# Patient Record
Sex: FEMALE | NOT HISPANIC OR LATINO | Employment: OTHER | URBAN - METROPOLITAN AREA
[De-identification: names, ages, dates, MRNs, and addresses within clinical notes are randomized per-mention and may not be internally consistent; named-entity substitution may affect disease eponyms.]

---

## 2019-01-10 ENCOUNTER — TELEPHONE (OUTPATIENT)
Dept: SLEEP CENTER | Facility: CLINIC | Age: 79
End: 2019-01-10

## 2019-01-16 ENCOUNTER — OFFICE VISIT (OUTPATIENT)
Dept: SLEEP CENTER | Facility: CLINIC | Age: 79
End: 2019-01-16
Payer: MEDICARE

## 2019-01-16 VITALS
HEART RATE: 91 BPM | WEIGHT: 293 LBS | DIASTOLIC BLOOD PRESSURE: 78 MMHG | SYSTOLIC BLOOD PRESSURE: 140 MMHG | HEIGHT: 69 IN | BODY MASS INDEX: 43.4 KG/M2

## 2019-01-16 DIAGNOSIS — G47.33 OSA (OBSTRUCTIVE SLEEP APNEA): Primary | ICD-10-CM

## 2019-01-16 PROCEDURE — 99213 OFFICE O/P EST LOW 20 MIN: CPT | Performed by: INTERNAL MEDICINE

## 2019-01-16 RX ORDER — LIDOCAINE 50 MG/G
1 PATCH TOPICAL EVERY 12 HOURS
COMMUNITY

## 2019-01-16 RX ORDER — ALLOPURINOL 100 MG/1
100 TABLET ORAL
COMMUNITY

## 2019-01-16 RX ORDER — TRAMADOL HYDROCHLORIDE 50 MG/1
TABLET ORAL
Refills: 0 | COMMUNITY
Start: 2018-12-20

## 2019-01-16 RX ORDER — ALLOPURINOL 100 MG/1
TABLET ORAL
COMMUNITY
Start: 2018-12-04

## 2019-01-16 RX ORDER — UBIDECARENONE 200 MG
200 CAPSULE ORAL
COMMUNITY

## 2019-01-16 RX ORDER — LEVOTHYROXINE AND LIOTHYRONINE 19; 4.5 UG/1; UG/1
TABLET ORAL DAILY
COMMUNITY

## 2019-01-16 RX ORDER — ASPIRIN 81 MG/1
TABLET, CHEWABLE ORAL DAILY
COMMUNITY

## 2019-01-16 RX ORDER — OMEPRAZOLE 40 MG/1
CAPSULE, DELAYED RELEASE ORAL
COMMUNITY
Start: 2018-11-08

## 2019-01-16 RX ORDER — LISINOPRIL AND HYDROCHLOROTHIAZIDE 25; 20 MG/1; MG/1
TABLET ORAL DAILY
COMMUNITY

## 2019-01-16 NOTE — PROGRESS NOTES
Progress Note - Sleep Center   Kip Katz NX MRN: [id number]      Reason for Visit:  70 y  o female here for annual follow-up    Assessment:  Doing well on current therapy of APAP 12 to 16 cm for obstructive sleep apnea (12 9 from )  Plan:  Continue same    Follow up: One year    History of Present Illness:  History of FAYE on PAP therapy  Fully compliant and deriving benefit  Review of Systems      Genitourinary hot flashes at night   Cardiology ankle/leg swelling   Gastrointestinal none   Neurology need to move extremities, muscle weakness, numbness/tingling of an extremity, poor concentration or confusion,  and balance problems   Constitutional fatigue and excessive sweating at night   Integumentary none   Psychiatry anxiety and depression   Musculoskeletal joint pain, muscle aches, back pain and leg cramps   Pulmonary shortness of breath with activity   ENT none   Endocrine frequent urination   Hematological none         Historical Information    Past Medical History: History reviewed  No pertinent past medical history  Reflex sympathetic dystrophy, fibromyalgia, mercury poisoning      Past Surgical History: History reviewed  No pertinent surgical history  Social History:   Social History     Social History    Marital status:      Spouse name: N/A    Number of children: N/A    Years of education: N/A     Social History Main Topics    Smoking status: Former Smoker     Quit date: 1969    Smokeless tobacco: Never Used    Alcohol use No    Drug use: No    Sexual activity: Not Asked     Other Topics Concern    None     Social History Narrative    None       Family History: History reviewed  No pertinent family history      Medications/Allergies:      Current Outpatient Prescriptions:     allopurinol (ZYLOPRIM) 100 mg tablet, , Disp: , Rfl:     allopurinol (ZYLOPRIM) 100 mg tablet, Take 100 mg by mouth, Disp: , Rfl:     aspirin 81 mg chewable tablet, Daily, Disp: , Rfl:     B Complex Vitamins (VITAMIN B COMPLEX PO), take one tablet once daily, Disp: , Rfl:     Cholecalciferol 62046 units TABS, Take 50,000 Units by mouth, Disp: , Rfl:     Coenzyme Q10 200 MG capsule, Take 200 mg by mouth, Disp: , Rfl:     DOCOSAHEXAENOIC ACID PO, 6 caps daily, Disp: , Rfl:     lidocaine (LIDODERM) 5 %, Place 1 patch on the skin every 12 (twelve) hours, Disp: , Rfl:     lisinopril-hydrochlorothiazide (PRINZIDE,ZESTORETIC) 20-25 MG per tablet, Daily, Disp: , Rfl:     metFORMIN (GLUCOPHAGE) 500 mg tablet, , Disp: , Rfl:     omeprazole (PriLOSEC) 40 MG capsule, , Disp: , Rfl:     thyroid desiccated (ARMOUR THYROID) 30 mg tablet, Daily, Disp: , Rfl:     traMADol (ULTRAM) 50 mg tablet, TAKE 1 TO 2 TABLETS EVERY 6 HOURS AS NEEDED FOR PAIN, Disp: , Rfl: 0          Objective      Vital Signs:   Vitals:    01/16/19 1100   BP: 140/78   Pulse: 91     [place] Sleepiness Scale: Total score: 3        Physical Exam:    General: Alert, appropriate, cooperative, overweight    Head: NC/AT    Skin: Warm, dry    Neuro: No motor abnormalities, cranial nerves appear intact    Extremity: No clubbing, cyanosis      DME Provider: Kody Weeks    I have reviewed and updated the review of systems as necessary    Counseling / Coordination of Care   I have spent 20 minutes with Patient  today in which greater than 50% of this time was spent in counseling/coordination of care regarding Intructions for management                Board Certified Sleep Specialist

## 2020-03-12 ENCOUNTER — OFFICE VISIT (OUTPATIENT)
Dept: SLEEP CENTER | Facility: CLINIC | Age: 80
End: 2020-03-12
Payer: MEDICARE

## 2020-03-12 VITALS
BODY MASS INDEX: 44.41 KG/M2 | HEART RATE: 48 BPM | WEIGHT: 293 LBS | SYSTOLIC BLOOD PRESSURE: 160 MMHG | DIASTOLIC BLOOD PRESSURE: 60 MMHG | HEIGHT: 68 IN

## 2020-03-12 DIAGNOSIS — G47.33 OSA (OBSTRUCTIVE SLEEP APNEA): Primary | ICD-10-CM

## 2020-03-12 PROCEDURE — 99213 OFFICE O/P EST LOW 20 MIN: CPT | Performed by: INTERNAL MEDICINE

## 2020-03-12 RX ORDER — METHYLPHENIDATE HYDROCHLORIDE 5 MG/1
5 TABLET ORAL DAILY
COMMUNITY
Start: 2020-02-26

## 2020-03-12 RX ORDER — FLUTICASONE PROPIONATE 50 MCG
SPRAY, SUSPENSION (ML) NASAL
COMMUNITY

## 2020-03-12 RX ORDER — AMPICILLIN TRIHYDRATE 250 MG
500 CAPSULE ORAL DAILY
COMMUNITY

## 2020-03-12 RX ORDER — NYSTATIN 100000 [USP'U]/G
POWDER TOPICAL
COMMUNITY

## 2020-03-12 RX ORDER — HYDROCHLOROTHIAZIDE 12.5 MG/1
TABLET ORAL
COMMUNITY

## 2020-03-12 RX ORDER — MAGNESIUM OXIDE 400 MG/1
400 TABLET ORAL 2 TIMES DAILY
COMMUNITY

## 2021-03-12 ENCOUNTER — OFFICE VISIT (OUTPATIENT)
Dept: SLEEP CENTER | Facility: CLINIC | Age: 81
End: 2021-03-12
Payer: MEDICARE

## 2021-03-12 VITALS — HEIGHT: 70 IN | BODY MASS INDEX: 41.37 KG/M2 | WEIGHT: 289 LBS

## 2021-03-12 DIAGNOSIS — G47.33 OSA (OBSTRUCTIVE SLEEP APNEA): Primary | ICD-10-CM

## 2021-03-12 PROCEDURE — 99213 OFFICE O/P EST LOW 20 MIN: CPT | Performed by: INTERNAL MEDICINE

## 2021-03-12 NOTE — PROGRESS NOTES
Progress Note - Sleep Center   Dominique Valladares UYP: MRN: [id number]      Reason for Visit:  68 y  o female here for annual follow-up    Assessment:  Doing well on current therapy of APAP 12 to 16 cm for  Mild FAYE ( AHI = 12 9)  Plan:  Continue same    Follow up: One year    History of Present Illness:  History of FAYE on PAP therapy  Fully compliant and deriving benefit  She is fatigued and in pain status post permanent pacemaker placement  She had a near cardiac arrest     Review of Systems      Genitourinary need to urinate more than twice a night and post menopausal (no peroids)   Cardiology Frequent chest pain or angina,  and ankle/leg swelling   Gastrointestinal none   Neurology muscle weakness, forgetfulness, poor concentration or confusion, , difficulty with memory and balance problems   Constitutional fatigue   Integumentary none   Psychiatry none   Musculoskeletal joint pain, muscle aches, back pain and leg cramps   Pulmonary shortness of breath with activity and frequent cough   ENT throat clearing   Endocrine frequent urination   Hematological none           I have reviewed and updated the review of systems as necessary      Historical Information    Past Medical History: No past medical history on file  Past Surgical History: No past surgical history on file  Social History:   Social History     Socioeconomic History    Marital status:       Spouse name: None    Number of children: None    Years of education: None    Highest education level: None   Occupational History    None   Social Needs    Financial resource strain: None    Food insecurity     Worry: None     Inability: None    Transportation needs     Medical: None     Non-medical: None   Tobacco Use    Smoking status: Former Smoker     Quit date: 1969     Years since quittin 1    Smokeless tobacco: Never Used   Substance and Sexual Activity    Alcohol use: No    Drug use: No    Sexual activity: None Lifestyle    Physical activity     Days per week: None     Minutes per session: None    Stress: None   Relationships    Social connections     Talks on phone: None     Gets together: None     Attends Sabianism service: None     Active member of club or organization: None     Attends meetings of clubs or organizations: None     Relationship status: None    Intimate partner violence     Fear of current or ex partner: None     Emotionally abused: None     Physically abused: None     Forced sexual activity: None   Other Topics Concern    None   Social History Narrative    None       Family History: No family history on file      Medications/Allergies:      Current Outpatient Medications:     allopurinol (ZYLOPRIM) 100 mg tablet, , Disp: , Rfl:     allopurinol (ZYLOPRIM) 100 mg tablet, Take 100 mg by mouth, Disp: , Rfl:     Ascorbic Acid, Vitamin C, (VITAMIN C) 100 MG tablet, Take 1,500 mg by mouth, Disp: , Rfl:     aspirin 81 mg chewable tablet, Daily, Disp: , Rfl:     B Complex Vitamins (VITAMIN B COMPLEX PO), take one tablet once daily, Disp: , Rfl:     Cholecalciferol 96154 units TABS, Take 50,000 Units by mouth, Disp: , Rfl:     Cinnamon 500 MG capsule, Take 500 mg by mouth daily, Disp: , Rfl:     Coenzyme Q10 200 MG capsule, Take 200 mg by mouth, Disp: , Rfl:     DOCOSAHEXAENOIC ACID PO, 6 caps daily, Disp: , Rfl:     fluticasone (FLONASE) 50 mcg/act nasal spray, fluticasone propionate 50 mcg/actuation nasal spray,suspension, Disp: , Rfl:     hydrochlorothiazide (HYDRODIURIL) 12 5 mg tablet, hydrochlorothiazide 12 5 mg tablet, Disp: , Rfl:     lidocaine (LIDODERM) 5 %, Place 1 patch on the skin every 12 (twelve) hours, Disp: , Rfl:     lisinopril-hydrochlorothiazide (PRINZIDE,ZESTORETIC) 20-25 MG per tablet, Daily, Disp: , Rfl:     magnesium oxide (MAG-OX) 400 mg tablet, Take 400 mg by mouth 2 (two) times a day, Disp: , Rfl:     metFORMIN (GLUCOPHAGE) 500 mg tablet, , Disp: , Rfl:    methylphenidate (RITALIN) 5 mg tablet, Take 5 mg by mouth daily, Disp: , Rfl:     Multiple Vitamins-Minerals (MULTIVITAMIN ADULT PO), Daily, Disp: , Rfl:     mupirocin (BACTROBAN) 2 % ointment, APPLY TO INFECTED  AREA DAILY AS DIRECTED, Disp: , Rfl:     nystatin (nystatin) powder, Nyamyc 100,000 unit/gram topical powder, Disp: , Rfl:     omeprazole (PriLOSEC) 40 MG capsule, , Disp: , Rfl:     thyroid desiccated (ARMOUR THYROID) 30 mg tablet, Daily, Disp: , Rfl:     traMADol (ULTRAM) 50 mg tablet, TAKE 1 TO 2 TABLETS EVERY 6 HOURS AS NEEDED FOR PAIN, Disp: , Rfl: 0          Objective      Vital Signs: There were no vitals filed for this visit  [place] Sleepiness Scale: Total score: 4        Physical Exam:    General: Alert, appropriate, cooperative, overweight    Head: NC/AT    Skin: Warm, dry    Neuro: No motor abnormalities, cranial nerves appear intact    Extremity: No clubbing, cyanosis      DME Provider: [employer]        Counseling / Coordination of Care   I have spent   Fifteen minutes with the patient today in which greater than 50% of this time was spent in counseling/coordination of care regarding: equipment and compliance  Board Certified Sleep Specialist    Portions of the record may have been created with voice recognition software  Occasional wrong word or "sound a like" substitutions may have occurred due to the inherent limitations of voice recognition software  Read the chart carefully and recognize, using context, where substitutions have occurred

## 2021-04-20 ENCOUNTER — TELEPHONE (OUTPATIENT)
Dept: SLEEP CENTER | Facility: CLINIC | Age: 81
End: 2021-04-20

## 2021-04-20 DIAGNOSIS — G47.33 OSA (OBSTRUCTIVE SLEEP APNEA): Primary | ICD-10-CM

## 2021-04-20 NOTE — TELEPHONE ENCOUNTER
Patient's machine is no longer working properly  We're going to submit for authorization for a replacement  Just need an updated script  PT is on an APAP 12-16cm  And she uses a full face mask  Once I receive the order we can continue to process the order  Thank you

## 2021-04-20 NOTE — TELEPHONE ENCOUNTER
Received voice message from patient stating her CPAP is 11years old and a new one is being ordered by "patient care"  She would like Dr Leroy Munoz to be made aware  Tried to call patient back to clarify but unable to leave voice message  Message on patient's voice mail states "enter remote access code"  Cannot proceed any further

## 2021-05-13 ENCOUNTER — TELEPHONE (OUTPATIENT)
Dept: SLEEP CENTER | Facility: CLINIC | Age: 81
End: 2021-05-13

## 2022-01-09 ENCOUNTER — TELEPHONE (OUTPATIENT)
Dept: OTHER | Facility: OTHER | Age: 82
End: 2022-01-09

## 2022-01-09 NOTE — TELEPHONE ENCOUNTER
PT:  Carmel Govea 1940- [id number] canceled colonoscopy appointment on 1/10/2020 with Dr Edis Taylor    This was not taken out of EPIC

## 2022-01-11 ENCOUNTER — TELEPHONE (OUTPATIENT)
Dept: GASTROENTEROLOGY | Facility: HOSPITAL | Age: 82
End: 2022-01-11

## 2022-05-17 ENCOUNTER — OFFICE VISIT (OUTPATIENT)
Dept: SLEEP CENTER | Facility: CLINIC | Age: 82
End: 2022-05-17
Payer: MEDICARE

## 2022-05-17 VITALS
HEIGHT: 70 IN | OXYGEN SATURATION: 75 % | HEART RATE: 108 BPM | WEIGHT: 287 LBS | BODY MASS INDEX: 41.09 KG/M2 | DIASTOLIC BLOOD PRESSURE: 70 MMHG | SYSTOLIC BLOOD PRESSURE: 144 MMHG

## 2022-05-17 DIAGNOSIS — G47.33 OSA (OBSTRUCTIVE SLEEP APNEA): Primary | ICD-10-CM

## 2022-05-17 PROCEDURE — 99213 OFFICE O/P EST LOW 20 MIN: CPT | Performed by: INTERNAL MEDICINE

## 2022-05-17 RX ORDER — NEOMYCIN SULFATE, POLYMYXIN B SULFATE AND DEXAMETHASONE 3.5; 10000; 1 MG/ML; [USP'U]/ML; MG/ML
SUSPENSION/ DROPS OPHTHALMIC
COMMUNITY
Start: 2022-03-20

## 2022-05-17 RX ORDER — CAYENNE 450 MG
CAPSULE ORAL
COMMUNITY

## 2022-05-17 RX ORDER — DULOXETIN HYDROCHLORIDE 60 MG/1
60 CAPSULE, DELAYED RELEASE ORAL DAILY
COMMUNITY
Start: 2022-05-13

## 2022-05-17 RX ORDER — CLOTRIMAZOLE AND BETAMETHASONE DIPROPIONATE 10; .64 MG/G; MG/G
CREAM TOPICAL
COMMUNITY

## 2022-05-17 RX ORDER — FUROSEMIDE 20 MG/1
TABLET ORAL
COMMUNITY

## 2022-05-17 RX ORDER — APIXABAN 5 MG/1
TABLET, FILM COATED ORAL
COMMUNITY
Start: 2022-05-16

## 2022-05-17 RX ORDER — LISINOPRIL 20 MG/1
TABLET ORAL
COMMUNITY

## 2022-05-17 RX ORDER — TRAMADOL HYDROCHLORIDE 50 MG/1
TABLET ORAL
COMMUNITY

## 2022-05-17 NOTE — PROGRESS NOTES
Progress Note - Sleep Center   Jesse Varghese [facility]:[id number] MRN: [id number]      Reason for Visit:  80 y  o female here for annual follow-up    Assessment:  Doing well on current therapy of APAP 12 to 16 cm for mild FAYE (AHI = 12 9)  Plan:  Continue same    Follow up: One year    History of Present Illness:  History of FAYE on PAP therapy  Fully compliant and deriving benefit  Nocturia, ankle swelling, muscle weakness, forgetfulness, difficulty with memory, balance problems, fatigue, joint pain, muscle aches, back pain, shortness of breath with activity, polyuria  I have reviewed and updated the review of systems as necessary      Historical Information    Past Medical History: No past medical history on file  Past Surgical History: No past surgical history on file  Social History:   Social History     Socioeconomic History    Marital status:      Spouse name: None    Number of children: None    Years of education: None    Highest education level: None   Occupational History    None   Tobacco Use    Smoking status: Former Smoker     Quit date: 1969     Years since quittin 3    Smokeless tobacco: Never Used   Substance and Sexual Activity    Alcohol use: No    Drug use: No    Sexual activity: None   Other Topics Concern    None   Social History Narrative    None     Social Determinants of Health     Financial Resource Strain: Not on file   Food Insecurity: Not on file   Transportation Needs: Not on file   Physical Activity: Not on file   Stress: Not on file   Social Connections: Not on file   Intimate Partner Violence: Not on file   Housing Stability: Not on file       Family History: No family history on file      Medications/Allergies:      Current Outpatient Medications:     ascorbic acid (VITAMIN C) 1000 MG tablet, Take by mouth, Disp: , Rfl:     B Complex Vitamins (VITAMIN B COMPLEX PO), take one tablet once daily, Disp: , Rfl:     Cholecalciferol 97817 units TABS, Take 50,000 Units by mouth, Disp: , Rfl:     Cinnamon 500 MG capsule, Take 500 mg by mouth daily, Disp: , Rfl:     clotrimazole-betamethasone (LOTRISONE) 1-0 05 % cream, clotrimazole-betamethasone 1 %-0 05 % topical cream  apply to AFFECTED AND SURROUNDING AREAS OF SKIN twice a day for 2   (REFER TO PRESCRIPTION NOTES)  , Disp: , Rfl:     Coenzyme Q10 200 MG capsule, Take 200 mg by mouth, Disp: , Rfl:     DOCOSAHEXAENOIC ACID PO, 6 caps daily, Disp: , Rfl:     DULoxetine (CYMBALTA) 60 mg delayed release capsule, Take 60 mg by mouth in the morning , Disp: , Rfl:     Eliquis 5 MG, , Disp: , Rfl:     fluticasone (FLONASE) 50 mcg/act nasal spray, fluticasone propionate 50 mcg/actuation nasal spray,suspension, Disp: , Rfl:     furosemide (LASIX) 20 mg tablet, Take by mouth, Disp: , Rfl:     hydrochlorothiazide (HYDRODIURIL) 12 5 mg tablet, hydrochlorothiazide 12 5 mg tablet, Disp: , Rfl:     lisinopril (ZESTRIL) 20 mg tablet, lisinopril 20 mg tablet  take 1 tablet by mouth twice a day HOLD FOR SBP <115/HR<65, Disp: , Rfl:     methylphenidate (RITALIN) 5 mg tablet, Take 5 mg by mouth daily, Disp: , Rfl:     Multiple Vitamins-Minerals (MULTIVITAMIN ADULT PO), Daily, Disp: , Rfl:     neomycin-polymyxin-dexamethasone (MAXITROL) ophthalmic suspension, INSTILL 2 DROPS EVERY 12 HOURS INTO THE LEFT EYE, Disp: , Rfl:     omeprazole (PriLOSEC) 40 MG capsule, , Disp: , Rfl:     potassium-sodium phosphateS (K-PHOS,PHOSPHA 250) 155-852-130 mg tablet, Virt-Phos 250 Neutral 250 mg tablet  take 1 tablet by mouth at bedtime ON MONDAY, WEDNESDAY, AND FRIDAY ONLY, Disp: , Rfl:     Probiotic Product (Acidophilus) CHEW, Chew, Disp: , Rfl:     thyroid desiccated (ARMOUR) 30 mg tablet, Daily, Disp: , Rfl:     traMADol (ULTRAM) 50 mg tablet, TAKE 1 TO 2 TABLETS EVERY 6 HOURS AS NEEDED FOR PAIN, Disp: , Rfl: 0    allopurinol (ZYLOPRIM) 100 mg tablet, , Disp: , Rfl:     allopurinol (ZYLOPRIM) 100 mg tablet, Take 100 mg by mouth, Disp: , Rfl:     Ascorbic Acid, Vitamin C, (VITAMIN C) 100 MG tablet, Take 1,500 mg by mouth (Patient not taking: Reported on 5/17/2022), Disp: , Rfl:     aspirin 81 mg chewable tablet, Daily (Patient not taking: Reported on 5/17/2022), Disp: , Rfl:     lidocaine (LIDODERM) 5 %, Place 1 patch on the skin every 12 (twelve) hours (Patient not taking: Reported on 5/17/2022), Disp: , Rfl:     lisinopril-hydrochlorothiazide (PRINZIDE,ZESTORETIC) 20-25 MG per tablet, Daily (Patient not taking: Reported on 5/17/2022), Disp: , Rfl:     magnesium oxide (MAG-OX) 400 mg tablet, Take 400 mg by mouth 2 (two) times a day (Patient not taking: Reported on 5/17/2022), Disp: , Rfl:     metFORMIN (GLUCOPHAGE) 500 mg tablet, , Disp: , Rfl:     mupirocin (BACTROBAN) 2 % ointment, APPLY TO INFECTED  AREA DAILY AS DIRECTED (Patient not taking: Reported on 5/17/2022), Disp: , Rfl:     nystatin (MYCOSTATIN) powder, Nyamyc 100,000 unit/gram topical powder (Patient not taking: Reported on 5/17/2022), Disp: , Rfl:     traMADol (ULTRAM) 50 mg tablet, Take by mouth (Patient not taking: No sig reported), Disp: , Rfl:           Objective      Vital Signs:   Vitals:    05/17/22 1137   BP: 144/70   Pulse: (!) 108   SpO2: (!) 75%     [place] Sleepiness Scale:          Physical Exam:    General: Alert, appropriate, cooperative, overweight    Head: NC/AT    Skin: Warm, dry    Neuro: No motor abnormalities, cranial nerves appear intact    Extremity: No clubbing, cyanosis      DME Provider: [employer]        Counseling / Coordination of Care   I have spent 15 minutes with the patient today in which greater than 50% of this time was spent in counseling/coordination of care regarding: equipment and compliance  Board Certified Sleep Specialist    Portions of the record may have been created with voice recognition software    Occasional wrong word or "sound a like" substitutions may have occurred due to the inherent limitations of voice recognition software  Read the chart carefully and recognize, using context, where substitutions have occurred

## 2022-05-25 ENCOUNTER — TELEPHONE (OUTPATIENT)
Dept: SLEEP CENTER | Facility: CLINIC | Age: 82
End: 2022-05-25

## 2022-09-02 ENCOUNTER — TELEPHONE (OUTPATIENT)
Dept: SLEEP CENTER | Facility: CLINIC | Age: 82
End: 2022-09-02

## 2022-09-02 NOTE — TELEPHONE ENCOUNTER
Returned the patient's call  Left call back message      Patient left message stating he feels the pressure is wrong on his new machine and also that the humidifier is not working

## 2023-05-22 ENCOUNTER — NURSING HOME VISIT (OUTPATIENT)
Dept: GERIATRICS | Facility: OTHER | Age: 83
End: 2023-05-22

## 2023-05-22 DIAGNOSIS — R26.2 AMBULATORY DYSFUNCTION: ICD-10-CM

## 2023-05-22 DIAGNOSIS — I50.30 HEART FAILURE WITH PRESERVED EJECTION FRACTION, UNSPECIFIED HF CHRONICITY (HCC): ICD-10-CM

## 2023-05-22 DIAGNOSIS — G70.00 GENERALIZED MYASTHENIA GRAVIS (HCC): ICD-10-CM

## 2023-05-22 DIAGNOSIS — G03.9 ARACHNOIDITIS: ICD-10-CM

## 2023-05-22 DIAGNOSIS — N17.9 AKI (ACUTE KIDNEY INJURY) (HCC): ICD-10-CM

## 2023-05-22 DIAGNOSIS — I10 ESSENTIAL HYPERTENSION: ICD-10-CM

## 2023-05-22 DIAGNOSIS — K21.9 GASTROESOPHAGEAL REFLUX DISEASE, UNSPECIFIED WHETHER ESOPHAGITIS PRESENT: ICD-10-CM

## 2023-05-22 DIAGNOSIS — I48.0 PAROXYSMAL ATRIAL FIBRILLATION (HCC): ICD-10-CM

## 2023-05-22 DIAGNOSIS — M46.20 SPINAL ABSCESS (HCC): ICD-10-CM

## 2023-05-22 DIAGNOSIS — G93.40 ENCEPHALOPATHY: ICD-10-CM

## 2023-05-22 DIAGNOSIS — E03.9 PRIMARY HYPOTHYROIDISM: ICD-10-CM

## 2023-05-22 DIAGNOSIS — M54.50 ACUTE MIDLINE LOW BACK PAIN WITHOUT SCIATICA: ICD-10-CM

## 2023-05-22 DIAGNOSIS — A41.9 SEPSIS, DUE TO UNSPECIFIED ORGANISM, UNSPECIFIED WHETHER ACUTE ORGAN DYSFUNCTION PRESENT (HCC): Primary | ICD-10-CM

## 2023-05-22 PROBLEM — I35.0 NONRHEUMATIC AORTIC VALVE STENOSIS: Status: ACTIVE | Noted: 2019-12-10

## 2023-05-22 PROBLEM — L03.115 CELLULITIS OF RIGHT LOWER EXTREMITY: Status: ACTIVE | Noted: 2023-02-02

## 2023-05-22 PROBLEM — R73.03 PREDIABETES: Status: ACTIVE | Noted: 2021-03-30

## 2023-05-22 PROBLEM — E04.1 THYROID NODULE: Status: ACTIVE | Noted: 2021-06-04

## 2023-05-22 PROBLEM — M79.7 FIBROMYALGIA: Status: ACTIVE | Noted: 2021-06-04

## 2023-05-22 PROBLEM — M81.0 AGE-RELATED OSTEOPOROSIS WITHOUT CURRENT PATHOLOGICAL FRACTURE: Status: ACTIVE | Noted: 2021-03-30

## 2023-05-22 PROBLEM — K44.9 HIATAL HERNIA: Status: ACTIVE | Noted: 2021-06-04

## 2023-05-22 PROBLEM — Z95.0 STATUS CARDIAC PACEMAKER: Status: ACTIVE | Noted: 2021-03-30

## 2023-05-22 PROBLEM — R53.81 DEBILITY: Status: ACTIVE | Noted: 2020-08-26

## 2023-05-22 PROBLEM — Z79.52 CURRENT CHRONIC USE OF SYSTEMIC STEROIDS: Status: ACTIVE | Noted: 2021-03-30

## 2023-05-22 PROBLEM — G70.01 MYASTHENIA GRAVIS WITH ACUTE EXACERBATION (HCC): Status: ACTIVE | Noted: 2022-09-03

## 2023-05-22 PROBLEM — Z95.0 PACEMAKER: Status: ACTIVE | Noted: 2020-09-16

## 2023-05-22 PROBLEM — R53.83 OTHER FATIGUE: Status: ACTIVE | Noted: 2019-11-14

## 2023-05-22 PROBLEM — R77.8 ELEVATED TROPONIN: Status: ACTIVE | Noted: 2023-02-02

## 2023-05-22 PROBLEM — I11.0 HYPERTENSIVE HEART DISEASE WITH HEART FAILURE (HCC): Status: ACTIVE | Noted: 2023-01-19

## 2023-05-22 PROBLEM — Z91.81 HISTORY OF FALL: Status: ACTIVE | Noted: 2021-03-30

## 2023-05-22 RX ORDER — OXYCODONE HYDROCHLORIDE 10 MG/1
1 TABLET ORAL 4 TIMES DAILY
COMMUNITY
End: 2023-05-22 | Stop reason: SDUPTHER

## 2023-05-22 RX ORDER — LORAZEPAM 0.5 MG/1
0.5 TABLET ORAL EVERY 6 HOURS PRN
COMMUNITY
Start: 2023-05-18

## 2023-05-22 RX ORDER — OXYCODONE HYDROCHLORIDE 10 MG/1
10 TABLET ORAL 4 TIMES DAILY
Qty: 120 TABLET | Refills: 0 | Status: SHIPPED | OUTPATIENT
Start: 2023-05-22

## 2023-05-22 RX ORDER — LACTULOSE 10 G/15ML
20 SOLUTION ORAL DAILY
COMMUNITY
Start: 2023-05-19 | End: 2023-06-18

## 2023-05-22 RX ORDER — SENNA PLUS 8.6 MG/1
17.2 TABLET ORAL 2 TIMES DAILY
COMMUNITY
Start: 2023-05-18 | End: 2024-05-17

## 2023-05-22 RX ORDER — OXYCODONE HYDROCHLORIDE 5 MG/1
5 TABLET ORAL EVERY 4 HOURS PRN
COMMUNITY

## 2023-05-22 RX ORDER — METHOCARBAMOL 500 MG/1
500 TABLET, FILM COATED ORAL 4 TIMES DAILY
COMMUNITY
Start: 2023-05-18 | End: 2023-06-17

## 2023-05-22 RX ORDER — POLYETHYLENE GLYCOL 3350 17 G/17G
17 POWDER, FOR SOLUTION ORAL DAILY
COMMUNITY
Start: 2023-05-19 | End: 2023-06-02

## 2023-05-22 RX ORDER — OLANZAPINE 2.5 MG/1
2.5 TABLET ORAL EVERY EVENING
COMMUNITY
Start: 2023-05-18 | End: 2023-06-17

## 2023-05-22 NOTE — ASSESSMENT & PLAN NOTE
Chronic but worst  Will increase oxycodone 10mg po to 4 times a day and discuss the change with patient and mary  Explained the risk and also can make her confusion worst but patient in significant distress due to pain     Will continue prn oxycodone also   Continue with supportive care

## 2023-05-22 NOTE — ASSESSMENT & PLAN NOTE
As per niece there myasthenia gravis doctor ordered increasing dose of prednisone  Continue with tapering up  Continue current meds   Continue monitoring for side affects

## 2023-05-22 NOTE — ASSESSMENT & PLAN NOTE
Multifactorial  Still not at baseline as per niece, was able to manage all her needs and finance but now cannot  Continue with supportive care  Continue current [facility]  Hospital work up negative presumed to be due to all events combined contributing - sepsis, anesthesia, medications, lengthy hospitalization

## 2023-05-22 NOTE — PROGRESS NOTES
[facility] [address] [place] [address] notes  SHORT TERM REHAB       NAME: Mik Reyes  AGE: [de-identified] y o  SEX: female    DATE OF ENCOUNTER: 5/22/2023    Assessment and Plan   Sepsis ([facility] )  Pacemaker infection  Had pacemaker removed and replaced   Completed course of antibiotics  Initially was on vanco and cefepime but had VRE so changed to Daptomycin but then switched to [address] due to CK going up  Had initially temporary pacemaker on 3/29/23 then PPM placed on 4/4/23  Continue follow up with cardiology      Spinal abscess (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   Continue no surgical intervention for now  Also MRI showed L5 spinal root compression   Continue monitoring symptoms   Continue follow up with neurosurgery     Acute midline low back pain without sciatica  Chronic but worst  Will increase oxycodone 10mg po to 4 times a day and discuss the change with patient and niece  Explained the risk and also can make her confusion worst but patient in significant distress due to pain     Will continue prn oxycodone also   Continue with supportive care      Ambulatory dysfunction  Multiple falls  Multifactorial  Lengthy hospital stay  Continue with safety measures  Continue with fall precautions  PT/OT eval and treat     Encephalopathy  Multifactorial  Still not at baseline as per niece, was able to manage all her needs and finance but now cannot  Continue with supportive care  Continue current meds  Hospital work up negative presumed to be due to all events combined contributing - sepsis, anesthesia, medications, lengthy hospitalization     Essential hypertension  Continue current meds  Continue monitoring BP     Generalized myasthenia gravis ([facility] )  As per mary there myasthenia gravis doctor ordered increasing dose of prednisone  Continue with tapering up  Continue current meds Continue monitoring for side affects     (HFpEF) heart failure with preserved ejection fraction (HCC)  Was treated with IV lasix initially on admission to hospital  Continue monitoring fluid status and weight  Currently not on diuretics     ANASTACIA (acute kidney injury) ([facility] )  Seems to be better  Last Cr 1 17  Will repeat BMP and monitor     Paroxysmal atrial fibrillation (HCC)  PPM in place  Continue current meds  Continue monitoring rate     GERD (gastroesophageal reflux disease)  Hx of rose's and on prednisone  Continue with PPI    Primary hypothyroidism  Continue synthroid        Chief Complaint     Severe pain in the back     History of Present Illness     [de-identified] yo female seen for admission to [address] [place] [address] after hospitalization  Patient unable to give proper history as her history is all over the place with some cueing of information confirms some information but due to her pain and encephalopathy unable to get good history  Called and talked to niece and obtained history and confirmed hospital record information  As per niece patient was admitted to the hospital due to shortness of breath  She was originally managed for CHF then was found to be septic and blood culture was positive  She was started on antibiotics  Her infection work up showed that she had vegetation on the pacemaker leads at which time it was removed and had a temporary pacemaker on 3/29/23 then had a PPM on 4/4/23  Meanwhile her blood culture was positive for VRE when she had antibiotics switched but her CK level was going up then again had her antibiotics switched  She completed the course of antibiotics  Her stay was complicated by an abscess in the L2-L3 on CT scan which was drained  She also had arachnoiditis along with L5 root compression on MRI which was evaluated by neurosurgery and recommended to follow up as out patient  Patient during her stay started to get more and more confused and had work up which was negative   They also had an OASIS consult done and decided if in a month if continues to decline and not doing well then consider hospice  Once patient was stable she discharged to [address] [place] [address] for therapy  PMHx     Past Medical History:   Diagnosis Date   • Adrenal adenoma, right 2018    3 7 x 2 1 x 3 5 cm   • ANASTACIA (acute kidney injury) ([facility] )    • Anxiety    • Atrial fibrillation (HCC)    • Gonzalez esophagus    • Cervicalgia    • Chronic kidney disease    • Essential hypertension    • Fibromyalgia    • Gastritis    • Generalized myasthenia gravis ([facility] )    • Hiatal hernia    • Hypertension    • Hypothyroid    • Lymphedema    • Mercury poisoning    • Morbid obesity ([place] [address] )    • Obesity    • FAYE (obstructive sleep apnea)    • FAYE on CPAP    • Osteoarthritis    • Reflex sympathetic dystrophy    • Sleep apnea    • Umbilical hernia      Past Surgical History:   Procedure Laterality Date   • ADENOIDECTOMY     • CARDIAC PACEMAKER PLACEMENT     • CATARACT EXTRACTION     • CHOLECYSTECTOMY     • KNEE ARTHROSCOPY      1995, 1995   • OOPHORECTOMY     • PELVIC LAPAROSCOPY     • SPINE SURGERY N/A     L4-5   • TONSILLECTOMY     • URETHRA SURGERY       Family History   Problem Relation Age of Onset   • Stroke Mother    • Heart disease Mother    • Kidney failure Father    • Heart disease Sister    • Colon cancer Brother      Social History     Socioeconomic History   • Marital status:       Spouse name: None   • Number of children: None   • Years of education: None   • Highest education level: None   Occupational History   • None   Tobacco Use   • Smoking status: Former     Types: Cigarettes     Quit date: 1969     Years since quittin 3   • Smokeless tobacco: Never   Vaping Use   • Vaping Use: Never used   Substance and Sexual Activity   • Alcohol use: No   • Drug use: No   • Sexual activity: None   Other Topics Concern   • None   Social History Narrative   • None     Social Determinants of Health     Financial Resource Strain: Not on file   Food Insecurity: Not on file   Transportation Needs: Not on file   Physical Activity: Not on file   Stress: Not on file   Social Connections: Not on file   Intimate Partner Violence: Not on file   Housing Stability: Not on file     No Known Allergies    Review of Systems     Back pain unbearable   Confused   All other review of system negative        Objective   Vital signs:  BP: 151/75  HR: 99  RR: 26  TEMP: 97 5F  SAT : 96% on RA    PHYSICAL EXAM:  GENERAL: restless, in pain  SKIN: warm, dry, no rash, no cyanosis  HEENT: normocephalic, atraumatic, no JVD, no Thyromegaly, no lymphadenopathy  LUNGS: CTA, no wheezing, no rales, expanded equally, no chest tenderness   HEART: normal rhythm, normal rate, no murmur, no gallop  ABDOMEN: soft non tender non distended bs+, no guarding or rebound tenderness  :  no suprapubic tenderness  MUSCULOSKELETAL: strength about 4/5 all extremities, decreased ROM within shoulders L>R, bilateral lower leg edema, no calf tenderness  NEUROLOGY: awake, alert, Oriented to person, PA, Month, year and day of the week, able to recall 2/3 object but still confused about second one  CN2-12 intact  PSYCH: cooperative, restless, anxious, in pain  Pertinent Laboratory/Diagnostic Studies:  Recent labs and diagnostic tests reviewed in nursing home EMR    Current Medications   Medications reviewed and signed off on nursing home EMR

## 2023-05-22 NOTE — ASSESSMENT & PLAN NOTE
Pacemaker infection  Had pacemaker removed and replaced   Completed course of antibiotics  Initially was on vanco and cefepime but had VRE so changed to Daptomycin but then switched to [address] due to CK going up  Had initially temporary pacemaker on 3/29/23 then PPM placed on 4/4/23  Continue follow up with cardiology

## 2023-05-22 NOTE — ASSESSMENT & PLAN NOTE
Seen by neurosurgery in the hospital   Continue no surgical intervention for now  Also MRI showed L5 spinal root compression   Continue monitoring symptoms   Continue follow up with neurosurgery

## 2023-05-22 NOTE — ASSESSMENT & PLAN NOTE
L2-L3 abscess which was drained as per mary  Continues to have pain which was chronic pain but worst as of late of the lower back

## 2023-05-31 ENCOUNTER — NURSING HOME VISIT (OUTPATIENT)
Dept: GERIATRICS | Facility: OTHER | Age: 83
End: 2023-05-31

## 2023-05-31 DIAGNOSIS — G89.4 CHRONIC PAIN SYNDROME: ICD-10-CM

## 2023-05-31 DIAGNOSIS — A41.9 SEPSIS, DUE TO UNSPECIFIED ORGANISM, UNSPECIFIED WHETHER ACUTE ORGAN DYSFUNCTION PRESENT (HCC): Primary | ICD-10-CM

## 2023-05-31 DIAGNOSIS — R26.2 AMBULATORY DYSFUNCTION: ICD-10-CM

## 2023-05-31 NOTE — ASSESSMENT & PLAN NOTE
Patient pain continues to be worst  Will increase methocarbamol to 4 times a day, continue all other meds  Does seem sleepy in bed but when out of bed staff report yelling/crying in pain

## 2023-05-31 NOTE — ASSESSMENT & PLAN NOTE
Multiple falls  Multifactorial  Continue with safety measure and fall precautions  Reviewed therapy notes   Discussed with therapy about patient

## 2023-05-31 NOTE — ASSESSMENT & PLAN NOTE
Pacemaker infection  Had pacemaker removed and replaced   Completed course of antibiotics   Continue monitoring for signs of infection  Continue follow up cardiology

## 2023-05-31 NOTE — PROGRESS NOTES
[facility] [address] notes  SHORT TERM REHAB       NAME: Lisa Mejia  AGE: [de-identified] y o  SEX: female    DATE OF ENCOUNTER: 5/31/2023    Assessment and Plan   Sepsis ([facility] )  Pacemaker infection  Had pacemaker removed and replaced   Completed course of antibiotics   Continue monitoring for signs of infection  Continue follow up cardiology       Ambulatory dysfunction  Multiple falls  Multifactorial  Continue with safety measure and fall precautions  Reviewed therapy notes   Discussed with therapy about patient     Chronic pain syndrome  Patient pain continues to be worst  Will increase methocarbamol to 4 times a day, continue all other meds  Does seem sleepy in bed but when out of bed staff report yelling/crying in pain       Chief Complaint     No new complaints    History of Present Illness     [de-identified] yo female seen for follow up  Patient seen for back pain, ambulatory dysfunction and sepsis  Reviewed nursing notes since last visit       PMHx     Past Medical History:   Diagnosis Date   • Adrenal adenoma, right 09/2018    3 7 x 2 1 x 3 5 cm   • NAASTACIA (acute kidney injury) ([facility] [address] )    • Anxiety    • Atrial fibrillation (HCC)    • Gonzalez esophagus    • Cervicalgia    • Chronic kidney disease    • Essential hypertension    • Fibromyalgia    • Gastritis    • Generalized myasthenia gravis ([facility] )    • Hiatal hernia    • Hypertension    • Hypothyroid    • Lymphedema    • Mercury poisoning    • Morbid obesity ([facility] )    • Obesity    • FAYE (obstructive sleep apnea)    • FAYE on CPAP    • Osteoarthritis    • Reflex sympathetic dystrophy    • Sleep apnea    • Umbilical hernia      Past Surgical History:   Procedure Laterality Date   • ADENOIDECTOMY     • CARDIAC PACEMAKER PLACEMENT     • CATARACT EXTRACTION     • CHOLECYSTECTOMY     • KNEE ARTHROSCOPY      1/1/1995, 12/31/1995   • OOPHORECTOMY     • PELVIC LAPAROSCOPY     • SPINE SURGERY N/A     L4-5   • TONSILLECTOMY     • URETHRA SURGERY       Family History   Problem Relation Age of Onset   • Stroke Mother    • Heart disease Mother    • Kidney failure Father    • Heart disease Sister    • Colon cancer Brother      Social History     Socioeconomic History   • Marital status:      Spouse name: Not on file   • Number of children: Not on file   • Years of education: Not on file   • Highest education level: Not on file   Occupational History   • Not on file   Tobacco Use   • Smoking status: Former     Types: Cigarettes     Quit date: 1969     Years since quittin 4   • Smokeless tobacco: Never   Vaping Use   • Vaping Use: Never used   Substance and Sexual Activity   • Alcohol use: No   • Drug use: No   • Sexual activity: Not on file   Other Topics Concern   • Not on file   Social History Narrative   • Not on file     Social Determinants of Health     Financial Resource Strain: Not on file   Food Insecurity: Not on file   Transportation Needs: Not on file   Physical Activity: Not on file   Stress: Not on file   Social Connections: Not on file   Intimate Partner Violence: Not on file   Housing Stability: Not on file     No Known Allergies    Review of Systems     Pain every where and mostly in the back worst with movement or sitting up  All other review of system negative        Objective   Vital signs reviewed from facility records  PHYSICAL EXAM:  GENERAL: no acute distress, sleeping in bed   SKIN: warm, dry, no rash, no cyanosis  HEENT: normocephalic, atraumatic, no JVD, no Thyromegaly, no lymphadenopathy  LUNGS: CTA, no wheezing, no rales, expanded equally, no chest tenderness   HEART: normal rhythm, normal rate, no murmur, no gallop  ABDOMEN: soft non tender non distended bs+, no guarding or rebound tenderness  :  no suprapubic tenderness  MUSCULOSKELETAL: strength about 4+/5 all extremities,  bilateral lower leg edema, no calf tenderness  NEUROLOGY: sleepy but wakes up for physical and verbal stimuli   PSYCH: cooperative, anxious         Pertinent Laboratory/Diagnostic Studies:  Recent labs and diagnostic tests reviewed in nursing home EMR    Current Medications   Medications reviewed and signed off on nursing home EMR

## 2023-06-05 ENCOUNTER — NURSING HOME VISIT (OUTPATIENT)
Dept: GERIATRICS | Facility: OTHER | Age: 83
End: 2023-06-05
Payer: MEDICARE

## 2023-06-05 DIAGNOSIS — I48.0 PAROXYSMAL ATRIAL FIBRILLATION (HCC): ICD-10-CM

## 2023-06-05 DIAGNOSIS — R60.0 EDEMA OF LEFT UPPER ARM: Primary | ICD-10-CM

## 2023-06-05 DIAGNOSIS — I50.32 CHRONIC HEART FAILURE WITH PRESERVED EJECTION FRACTION (HCC): ICD-10-CM

## 2023-06-05 PROCEDURE — 99309 SBSQ NF CARE MODERATE MDM 30: CPT | Performed by: NURSE PRACTITIONER

## 2023-06-06 PROBLEM — R60.0 EDEMA OF LEFT UPPER ARM: Status: ACTIVE | Noted: 2023-06-06

## 2023-06-06 NOTE — ASSESSMENT & PLAN NOTE
Rapid onset unilateral LUE +4 pitting edema with weeping  Swelling extended from left side of neck and clavicular area   Weight loss from last weight check (6/4/2023)  Trace B/L LE edema  RUE WNL  Will send to ER for further evaluation  = rule out large thrombus/ lymphatic obstruction versus cardiac etiology

## 2023-06-06 NOTE — PROGRESS NOTES
[address], [address], [facility], [facility]  [phone]    NAME: Irineo Carmona  AGE: [de-identified] y o  SEX: female    Progress Note    Location: [facility]  POS: 31 (SNF)    Assessment/Plan:    Edema of left upper arm  Rapid onset unilateral LUE +4 pitting edema with weeping  Swelling extended from left side of neck and clavicular area   Weight loss from last weight check (6/4/2023)  Trace B/L LE edema  RUE WNL  Will send to ER for further evaluation  = rule out large thrombus/ lymphatic obstruction versus cardiac etiology  (HFpEF) heart failure with preserved ejection fraction (HCC)  Wt Readings from Last 3 Encounters:   01/16/23 131 kg (289 lb)   05/17/22 130 kg (287 lb)   03/12/21 131 kg (289 lb)   Weight loss on review  SNF admission wt: 248 (5/21/2023) <= 246 0 lbs (6/4/2023)  Patient reports she is baseline cardiopulmonary symptoms  Not on diuretic therapy    Paroxysmal atrial fibrillation (HCC)  At HR goal in SNF: less than 100/min  Currently on Eliquis 5mg BID  Not on BB therapy      Chief complaint / Reason for visit: Acute Visit    History of Present Illness: This is an 68-year-old female patient currently admitted at [address] [place] [address] (5/19/2023 to present) following discharge from acute care hospitalization ([facility]: 3/26/2023 to 5/19/2023) with Dx of Bacterial Endocarditis, Arachnoiditis, VRE Bacteremia, Osteomyelitis, Acute on Chronic Diastolic CHF, Cellulitis of LE, Ambulatory dysfunction with deconditioning  Patient is seen and examined today per nursing request for rapid onset worsening of LUE edema noted this morning  Per nursing, the affected extremity only has trace swelling but now observed to have a +4 pitting weeping edema on the whole arm  Patient is in bed for this visit -alert, cooperative, calm, pleasant and not in distress  Patient did report of increased pain all over  Patient acknowledged that swelling to left arm is much worse today    On assessment, noted +4 weeping/ edema to LUE - worse on the doprsum of hand, (+) enlarged left side of neck and clavicular area  Right UE WNL  Chronic trace B/L LE edema  No lymphadenopathies palpated  Patient on Eliquis  Will send patient to ER for further evaluation and management to rule out Large thrombus DVT/ Lymphatic obstruction versus cardiac etiology  Nursing and prior provider notes reviewed on this visit  Discussed visit with PCP and nursing staff/ supervisor  Review of Systems:  Per history of present illness, all other systems reviewed and negative  HISTORY:  Medical Hx: Reviewed, unchanged  Family Hx: Reviewed, unchanged  Soc Hx: Reviewed,  unchanged    ALLERGY: Reviewed, unchanged  No Known Allergies     PHYSICAL EXAM:  Vital Signs: T97 3F-P105 -R20 BP: 160/92 SpO2: 98% RA  Weight: 246 0 lbs (6/4/2023) <=     General: NAD  Well appearing  No acute distress  Some confusion  Increased pain report to back  Head: Atraumatic  Normocephalic  Eye Exam: anicteric sclera, no discharge, PERRLA, No injection  Oral Exam: moist mucous membranes, no buccaloropharyngeal erythema, palatine tonsils WNL  Neck Exam: no anterior cervical lymphadenopathy noted, neck supple  Trachea midline, no carotid bruit, no masses  Enlarged left side of neck  Cardiovascular: regular rate, regular rhythm, (+)murmurs, No rubs or gallops  S1 and S2  Pulmonary: no wheeze, no rhonchi, no rales  No chest tenderness  Normal chest wall expansion  Abdominal: soft, non-tender, nondistended, bowel sounds audible x 4 quadrants  No palpable hepatosplenomegaly, no tympany  : Non distended bladder  Continent  Extremities: trace B/L LE edema, +4 pitting edema LUE  RUE WNL  Skin: no rashes  Intact skin  Neurological: alert, cooperative and responsive, Oriented x 3   Cranial Nerves II-XII grossly intact, no tics, normal sensation to pressure and light touch   moving all 4 extremities symmetrically    Laboratory results / Imaging reviewed: "Hard copy/ies in medical chart  Current Medications: All medications reviewed and updated in Nursing Home Chart    Please note: This note was completed in part utilizing a voice-recognition software may have been used in the preparation of this document  Grammatical errors, random word insertion, spelling mistakes, and incomplete sentences may be an occasional consequence of the system secondary to software limitations, ambient noise and hardware issues  Occasional wrong word or \"sound-alike\" substitutions may have occurred due to the inherent limitations of voice recognition software  At the time of dictation, efforts were made to edit, clarify and/or correct errors  Interpretation should be guided by context  Please read the chart carefully and recognize, using context, where substitutions have occurred  If you have any questions or concerns about the context, text or information contained within the body of this dictation, please contact myself, the provider, for further clarification        PADMINI Mccullough  6/6/2023    "

## 2023-06-06 NOTE — ASSESSMENT & PLAN NOTE
Wt Readings from Last 3 Encounters:   01/16/23 131 kg (289 lb)   05/17/22 130 kg (287 lb)   03/12/21 131 kg (289 lb)   Weight loss on review  SNF admission wt: 248 (5/21/2023) <= 246 0 lbs (6/4/2023)  Patient reports she is baseline cardiopulmonary symptoms  Not on diuretic therapy